# Patient Record
Sex: FEMALE | Race: WHITE | NOT HISPANIC OR LATINO | URBAN - METROPOLITAN AREA
[De-identification: names, ages, dates, MRNs, and addresses within clinical notes are randomized per-mention and may not be internally consistent; named-entity substitution may affect disease eponyms.]

---

## 2023-12-12 ENCOUNTER — TELEPHONE (OUTPATIENT)
Dept: OTHER | Age: 33
End: 2023-12-12

## 2023-12-12 ENCOUNTER — V-VISIT (OUTPATIENT)
Dept: URGENT CARE | Age: 33
End: 2023-12-12

## 2023-12-12 VITALS
HEIGHT: 66 IN | OXYGEN SATURATION: 97 % | DIASTOLIC BLOOD PRESSURE: 74 MMHG | TEMPERATURE: 99 F | SYSTOLIC BLOOD PRESSURE: 130 MMHG | RESPIRATION RATE: 16 BRPM | BODY MASS INDEX: 27.32 KG/M2 | HEART RATE: 109 BPM | WEIGHT: 170 LBS

## 2023-12-12 DIAGNOSIS — U07.1 COVID-19: Primary | ICD-10-CM

## 2023-12-12 LAB
FLUAV AG UPPER RESP QL IA.RAPID: NEGATIVE
FLUBV AG UPPER RESP QL IA.RAPID: NEGATIVE
INTERNAL PROCEDURAL CONTROLS ACCEPTABLE: YES
S PYO AG THROAT QL IA.RAPID: NEGATIVE
SARS-COV+SARS-COV-2 AG RESP QL IA.RAPID: DETECTED
TEST LOT EXPIRATION DATE: ABNORMAL
TEST LOT EXPIRATION DATE: NORMAL
TEST LOT NUMBER: ABNORMAL
TEST LOT NUMBER: NORMAL

## 2023-12-12 PROCEDURE — 87880 STREP A ASSAY W/OPTIC: CPT | Performed by: NURSE PRACTITIONER

## 2023-12-12 PROCEDURE — 99203 OFFICE O/P NEW LOW 30 MIN: CPT | Performed by: NURSE PRACTITIONER

## 2023-12-12 PROCEDURE — 87428 SARSCOV & INF VIR A&B AG IA: CPT | Performed by: NURSE PRACTITIONER

## 2023-12-12 RX ORDER — TIMOLOL MALEATE 5 MG/ML
SOLUTION/ DROPS OPHTHALMIC
COMMUNITY
Start: 2021-12-05

## 2023-12-12 ASSESSMENT — ENCOUNTER SYMPTOMS
SORE THROAT: 1
ABDOMINAL PAIN: 0
APPETITE CHANGE: 0
COUGH: 1
DIARRHEA: 0
VOMITING: 0
FEVER: 1
NEUROLOGICAL NEGATIVE: 1
NAUSEA: 0
CHEST TIGHTNESS: 0
TROUBLE SWALLOWING: 0
WHEEZING: 0

## 2024-03-29 ENCOUNTER — HOSPITAL ENCOUNTER (OUTPATIENT)
Age: 34
Discharge: HOME | End: 2024-03-29
Payer: COMMERCIAL

## 2024-03-29 VITALS
TEMPERATURE: 98 F | HEART RATE: 78 BPM | OXYGEN SATURATION: 100 % | DIASTOLIC BLOOD PRESSURE: 76 MMHG | SYSTOLIC BLOOD PRESSURE: 156 MMHG | RESPIRATION RATE: 20 BRPM
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
## 2024-03-29 DIAGNOSIS — S61.052A DOG BITE OF THUMB, LEFT, INITIAL ENCOUNTER: Primary | ICD-10-CM

## 2024-03-29 PROCEDURE — 99204 OFFICE O/P NEW MOD 45 MIN: CPT | Performed by: NURSE PRACTITIONER

## 2024-03-29 RX ORDER — TIMOLOL MALEATE 5 MG/ML
SOLUTION/ DROPS OPHTHALMIC
COMMUNITY

## 2024-03-29 RX ORDER — AMOXICILLIN AND CLAVULANATE POTASSIUM 875; 125 MG/1; MG/1
1 TABLET, FILM COATED ORAL 2 TIMES DAILY
Qty: 20 TABLET | Refills: 0 | Status: SHIPPED | OUTPATIENT
Start: 2024-03-29 | End: 2024-04-08

## 2024-03-29 NOTE — DISCHARGE INSTRUCTIONS
Where is the splint/finger protector as directed.  Apply bacitracin ointment and a Band-Aid over the open area.  Take the antibiotic as prescribed.  Take the antibiotic with food to avoid stomach upset.  Follow-up with the primary care provider of your choice in the next 3 to 5 days.  If any difficulty getting in and if you have any increasing pain, redness, swelling, discharge from the area or you develop a fever or other concerns return here.  Thank you for choosing our [facility] for your medical condition today. Please be sure to follow up with your primary care provider in 2 days if no improvement, or as directed. If any worsening of your symptoms or other concerns call your primary care provider, return here or go to the emergency department.

## 2024-03-29 NOTE — ED PROVIDER NOTES
Chief Complaint   Patient presents with    Bite       HPI:     Kandis Farias is a 33 year old female who presents with a chief complaint of a dog bite to her left thumb sustained 2 hours ago.  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  Bleeding was controlled at the site.  A portion of the nail was involved.  Patient states she did clean it immediately following the injury.  She is up-to-date on tetanus.  And the dog is believed to have all of its necessary immunizations.  She denies any other injury.  She states the pain is a throbbing pain at 5/10.      PFSH    PFS asessment screens reviewed and agree.  Nurses notes reviewed I agree with documentation.    No family history on file.  Family history is reviewed and is as noted in HPI.  Social History     Socioeconomic History    Marital status:      Spouse name: Not on file    Number of children: Not on file    Years of education: Not on file    Highest education level: Not on file   Occupational History    Not on file   Tobacco Use    Smoking status: Never     Passive exposure: Never    Smokeless tobacco: Never   Vaping Use    Vaping Use: Never used   Substance and Sexual Activity    Alcohol use: Not on file    Drug use: Not on file    Sexual activity: Not on file   Other Topics Concern    Not on file   Social History Narrative    Not on file     Social Determinants of Health     Financial Resource Strain: Not on file   Food Insecurity: Not on file   Transportation Needs: Not on file   Physical Activity: Not on file   Stress: Not on file   Social Connections: Not on file   Housing Stability: Not on file         ROS:   Positive for stated complaint: Dog bite left thumb  All other systems reviewed and negative except as noted above.  Constitutional and Vital Signs Reviewed.      Physical Exam:     Findings:    /76   Pulse  78   Temp 97.8 °F (36.6 °C) (Oral)   Resp 20   SpO2 100%   GENERAL: well developed, well nourished, well hydrated, no distress  SKIN: good skin turgor, has laceration at the medial border of her left thumbnail.  Laceration is 0.5 cm and vertical.  A small corner of the thumbnail is partially avulsed.  No other injury.  CARDIO: RRR Cap refill brisk  EXTREMITIES: MALHOTRA without difficulty, full range of motion of left thumb.  No bony tenderness.  HEAD: normocephalic, atraumatic, no facial asymmetry  EYES: bilateral sclera non icteric, no conjunctival injection or discharge, no periorbital swelling  THROAT: airway patent,   LUNGS:  No signs of increased WOB  NEURO: No observed focal deficits, speech clear  PSYCH: Alert and oriented x3.  Answering questions appropriately.  Mood appropriate.    MDM/Assessment/Plan:   Orders for this encounter:    Orders Placed This Encounter    amoxicillin clavulanate 875-125 MG Oral Tab     Sig: Take 1 tablet by mouth 2 (two) times daily for 10 days.     Dispense:  20 tablet     Refill:  0       Labs performed this visit:  No results found for this or any previous visit (from the past 10 hour(s)).    MDM:  Differential diagnosis includes dog bite, laceration to finger, laceration to nail, laceration to nailbed, subungual hematoma, wound infection.  Based on patient history and clinical exam this appears to be a puncture wound/small laceration that involves a corner of the thumbnail.  Will prescribe antibiotics and provide a metal cage as a finger protection and allow the nail to grow and be trimmed as it grows.  Patient advised to keep the wound covered when working with the animals once it begins to heal can exposed to air.  Advised to use a Band-Aid to the edge of the avulsed nail does not catch on anything.  Given instructions regarding wound infection and if present to return here or follow-up with a primary care provider.  Patient lives in [facility] she does not currently have  a primary care provider a list of [facility] primary care providers was provided and she states she will follow-up with a primary care provider of her choice or return here if any concerns.     Counseled: Patient, regarding diagnosis, regarding treatment plan, regarding diagnostic results, regarding prescription, I have discussed with the patient the results of tests, differential diagnosis, and warning signs and symptoms that should prompt immediate return. The patient understands these instructions and agrees to the follow-up plan provided. There is no barriers to learning. Appropriate f/u given. Emergency precautions given. Patient agrees to return for any concerns/ problems/complications.      Diagnosis:    ICD-10-CM    1. Dog bite of thumb, left, initial encounter  S61.052A     W54.0XXA           All results reviewed and discussed with patient.  See AVS for detailed discharge instructions for your condition today.    Follow Up with:  Primary care provider of your choice  Review the book of primary care providers provided to you today and call on Monday to schedule a follow-up appointment for dog bite of your thumb.  If any difficulty getting in and you have any concerns please return here.

## 2025-01-14 ENCOUNTER — TELEPHONE (OUTPATIENT)
Dept: OTHER | Age: 35
End: 2025-01-14

## 2025-01-14 ENCOUNTER — V-VISIT (OUTPATIENT)
Dept: URGENT CARE | Age: 35
End: 2025-01-14

## 2025-01-14 VITALS
OXYGEN SATURATION: 95 % | WEIGHT: 165 LBS | TEMPERATURE: 99.9 F | DIASTOLIC BLOOD PRESSURE: 95 MMHG | HEIGHT: 66 IN | BODY MASS INDEX: 26.52 KG/M2 | HEART RATE: 128 BPM | SYSTOLIC BLOOD PRESSURE: 140 MMHG | RESPIRATION RATE: 18 BRPM

## 2025-01-14 DIAGNOSIS — J11.1 INFLUENZA: Primary | ICD-10-CM

## 2025-01-14 DIAGNOSIS — H61.23 BILATERAL IMPACTED CERUMEN: ICD-10-CM

## 2025-01-14 DIAGNOSIS — R68.89 FLU-LIKE SYMPTOMS: ICD-10-CM

## 2025-01-14 DIAGNOSIS — J02.9 SORE THROAT: ICD-10-CM

## 2025-01-14 LAB
FLUAV AG UPPER RESP QL IA.RAPID: NEGATIVE
FLUBV AG UPPER RESP QL IA.RAPID: NEGATIVE
INTERNAL PROCEDURAL CONTROLS ACCEPTABLE: YES
S PYO AG THROAT QL IA.RAPID: NEGATIVE
SARS-COV+SARS-COV-2 AG RESP QL IA.RAPID: NOT DETECTED
TEST LOT EXPIRATION DATE: NORMAL
TEST LOT EXPIRATION DATE: NORMAL
TEST LOT NUMBER: NORMAL
TEST LOT NUMBER: NORMAL

## 2025-01-14 PROCEDURE — 98005 SYNCH AUDIO-VIDEO EST LOW 20: CPT | Performed by: NURSE PRACTITIONER

## 2025-01-14 PROCEDURE — 87428 SARSCOV & INF VIR A&B AG IA: CPT | Performed by: NURSE PRACTITIONER

## 2025-01-14 PROCEDURE — 87070 CULTURE OTHR SPECIMN AEROBIC: CPT | Performed by: CLINICAL MEDICAL LABORATORY

## 2025-01-14 PROCEDURE — 87880 STREP A ASSAY W/OPTIC: CPT | Performed by: NURSE PRACTITIONER

## 2025-01-14 RX ORDER — GUAIFENESIN 100 MG/5ML
15 SOLUTION ORAL 4 TIMES DAILY PRN
Qty: 236 ML | Refills: 0 | Status: SHIPPED | OUTPATIENT
Start: 2025-01-14

## 2025-01-14 RX ORDER — OSELTAMIVIR PHOSPHATE 75 MG/1
75 CAPSULE ORAL 2 TIMES DAILY
Qty: 10 CAPSULE | Refills: 0 | Status: SHIPPED | OUTPATIENT
Start: 2025-01-14 | End: 2025-01-19

## 2025-01-14 RX ORDER — ECHINACEA PURPUREA EXTRACT 125 MG
1 TABLET ORAL PRN
Qty: 30 ML | Refills: 0 | Status: SHIPPED | OUTPATIENT
Start: 2025-01-14

## 2025-01-14 ASSESSMENT — ENCOUNTER SYMPTOMS
FEVER: 1
FATIGUE: 1
CHILLS: 1
GASTROINTESTINAL NEGATIVE: 1
CHANGE IN BOWEL HABIT: 0
NAUSEA: 0
COUGH: 1
VOMITING: 0
RHINORRHEA: 1
SORE THROAT: 1
NEUROLOGICAL NEGATIVE: 1

## 2025-01-17 ENCOUNTER — TELEPHONE (OUTPATIENT)
Dept: FAMILY MEDICINE | Age: 35
End: 2025-01-17

## 2025-01-17 LAB — BACTERIA THROAT AEROBE CULT: NORMAL
